# Patient Record
Sex: FEMALE | Race: WHITE | NOT HISPANIC OR LATINO
[De-identification: names, ages, dates, MRNs, and addresses within clinical notes are randomized per-mention and may not be internally consistent; named-entity substitution may affect disease eponyms.]

---

## 2017-03-06 ENCOUNTER — FORM ENCOUNTER (OUTPATIENT)
Age: 72
End: 2017-03-06

## 2017-03-07 ENCOUNTER — APPOINTMENT (OUTPATIENT)
Dept: ORTHOPEDIC SURGERY | Facility: CLINIC | Age: 72
End: 2017-03-07

## 2017-03-07 ENCOUNTER — OUTPATIENT (OUTPATIENT)
Dept: OUTPATIENT SERVICES | Facility: HOSPITAL | Age: 72
LOS: 1 days | End: 2017-03-07
Payer: MEDICARE

## 2017-03-07 VITALS
SYSTOLIC BLOOD PRESSURE: 138 MMHG | BODY MASS INDEX: 22.99 KG/M2 | HEIGHT: 65 IN | DIASTOLIC BLOOD PRESSURE: 76 MMHG | WEIGHT: 138 LBS

## 2017-03-07 DIAGNOSIS — M17.9 OSTEOARTHRITIS OF KNEE, UNSPECIFIED: ICD-10-CM

## 2017-03-07 PROCEDURE — 73564 X-RAY EXAM KNEE 4 OR MORE: CPT

## 2017-03-07 PROCEDURE — 73564 X-RAY EXAM KNEE 4 OR MORE: CPT | Mod: 26,50

## 2018-09-18 ENCOUNTER — APPOINTMENT (OUTPATIENT)
Dept: ORTHOPEDIC SURGERY | Facility: CLINIC | Age: 73
End: 2018-09-18
Payer: MEDICARE

## 2018-09-18 VITALS
HEART RATE: 78 BPM | WEIGHT: 134 LBS | DIASTOLIC BLOOD PRESSURE: 80 MMHG | BODY MASS INDEX: 22.33 KG/M2 | OXYGEN SATURATION: 98 % | SYSTOLIC BLOOD PRESSURE: 120 MMHG | HEIGHT: 65 IN

## 2018-09-18 DIAGNOSIS — Z96.649 PRESENCE OF UNSPECIFIED ARTIFICIAL HIP JOINT: ICD-10-CM

## 2018-09-18 DIAGNOSIS — Z87.891 PERSONAL HISTORY OF NICOTINE DEPENDENCE: ICD-10-CM

## 2018-09-18 DIAGNOSIS — Z82.49 FAMILY HISTORY OF ISCHEMIC HEART DISEASE AND OTHER DISEASES OF THE CIRCULATORY SYSTEM: ICD-10-CM

## 2018-09-18 DIAGNOSIS — Z78.9 OTHER SPECIFIED HEALTH STATUS: ICD-10-CM

## 2018-09-18 DIAGNOSIS — Z80.1 FAMILY HISTORY OF MALIGNANT NEOPLASM OF TRACHEA, BRONCHUS AND LUNG: ICD-10-CM

## 2018-09-18 PROCEDURE — 99214 OFFICE O/P EST MOD 30 MIN: CPT

## 2018-09-18 RX ORDER — BERBERINE CHLOR/SEAWEED/CHROM 500-250 MG
CAPSULE ORAL
Refills: 0 | Status: ACTIVE | COMMUNITY

## 2018-09-28 ENCOUNTER — APPOINTMENT (OUTPATIENT)
Dept: ORTHOPEDIC SURGERY | Facility: CLINIC | Age: 73
End: 2018-09-28

## 2018-10-08 ENCOUNTER — RX RENEWAL (OUTPATIENT)
Age: 73
End: 2018-10-08

## 2018-10-09 ENCOUNTER — APPOINTMENT (OUTPATIENT)
Age: 73
End: 2018-10-09
Payer: MEDICARE

## 2018-10-09 DIAGNOSIS — S83.281A OTHER TEAR OF LATERAL MENISCUS, CURRENT INJURY, RIGHT KNEE, INITIAL ENCOUNTER: ICD-10-CM

## 2018-10-09 PROCEDURE — 99214 OFFICE O/P EST MOD 30 MIN: CPT

## 2019-01-04 ENCOUNTER — OTHER (OUTPATIENT)
Age: 74
End: 2019-01-04

## 2022-07-19 RX ORDER — SODIUM CHLORIDE 9 MG/ML
1000 INJECTION, SOLUTION INTRAVENOUS
Refills: 0 | Status: DISCONTINUED | OUTPATIENT
Start: 2022-07-20 | End: 2022-07-20

## 2022-07-19 NOTE — ASU PATIENT PROFILE, ADULT - NS PREOP UNDERSTANDS INFO
As per MD nothing to eat or drink after midnight. Patient instructed to come with photo ID, vaccination and insurance card; dress comfortable; no jewelries; no smoking, alcohol drinking or illicit drug use tonight; escort must show proof of vaccination and photo ID; address, parking information and call back number provided to patient/yes

## 2022-07-20 ENCOUNTER — OUTPATIENT (OUTPATIENT)
Dept: OUTPATIENT SERVICES | Facility: HOSPITAL | Age: 77
LOS: 1 days | Discharge: HOME | End: 2022-07-20

## 2022-07-20 VITALS
HEART RATE: 65 BPM | TEMPERATURE: 97 F | SYSTOLIC BLOOD PRESSURE: 136 MMHG | RESPIRATION RATE: 14 BRPM | DIASTOLIC BLOOD PRESSURE: 63 MMHG | OXYGEN SATURATION: 98 %

## 2022-07-20 VITALS
HEIGHT: 64 IN | RESPIRATION RATE: 16 BRPM | SYSTOLIC BLOOD PRESSURE: 165 MMHG | OXYGEN SATURATION: 98 % | DIASTOLIC BLOOD PRESSURE: 68 MMHG | TEMPERATURE: 98 F | WEIGHT: 126.77 LBS | HEART RATE: 67 BPM

## 2022-07-20 DIAGNOSIS — H26.9 UNSPECIFIED CATARACT: Chronic | ICD-10-CM

## 2022-07-20 DIAGNOSIS — Z96.642 PRESENCE OF LEFT ARTIFICIAL HIP JOINT: Chronic | ICD-10-CM

## 2022-07-20 DIAGNOSIS — Z98.890 OTHER SPECIFIED POSTPROCEDURAL STATES: Chronic | ICD-10-CM

## 2022-07-20 DEVICE — LENS IOL TECNIS SMPLCTY 1PC CLR MONO DCB0000 23.5D
Type: IMPLANTABLE DEVICE | Site: LEFT | Status: NON-FUNCTIONAL
Removed: 2022-07-20

## 2022-07-20 RX ORDER — ACETAMINOPHEN 500 MG
650 TABLET ORAL ONCE
Refills: 0 | Status: DISCONTINUED | OUTPATIENT
Start: 2022-07-20 | End: 2022-07-20

## 2022-07-20 RX ORDER — CYCLOPENTOLATE HYDROCHLORIDE 10 MG/ML
1 SOLUTION/ DROPS OPHTHALMIC
Refills: 0 | Status: COMPLETED | OUTPATIENT
Start: 2022-07-20 | End: 2022-07-20

## 2022-07-20 RX ORDER — SODIUM CHLORIDE 9 MG/ML
1000 INJECTION, SOLUTION INTRAVENOUS
Refills: 0 | Status: DISCONTINUED | OUTPATIENT
Start: 2022-07-20 | End: 2022-07-21

## 2022-07-20 RX ORDER — OFLOXACIN 0.3 %
1 DROPS OPHTHALMIC (EYE)
Refills: 0 | Status: COMPLETED | OUTPATIENT
Start: 2022-07-20 | End: 2022-07-20

## 2022-07-20 RX ORDER — L.ACIDOPH/B.ANIMALIS/B.LONGUM 15B CELL
1 CAPSULE ORAL
Qty: 0 | Refills: 0 | DISCHARGE

## 2022-07-20 RX ORDER — UBIDECARENONE 100 MG
10 CAPSULE ORAL
Qty: 0 | Refills: 0 | DISCHARGE

## 2022-07-20 RX ORDER — TROPICAMIDE 1 %
1 DROPS OPHTHALMIC (EYE)
Refills: 0 | Status: COMPLETED | OUTPATIENT
Start: 2022-07-20 | End: 2022-07-20

## 2022-07-20 RX ORDER — CHOLECALCIFEROL (VITAMIN D3) 125 MCG
1 CAPSULE ORAL
Qty: 0 | Refills: 0 | DISCHARGE

## 2022-07-20 RX ORDER — PHENYLEPHRINE HCL 2.5 %
1 DROPS OPHTHALMIC (EYE)
Refills: 0 | Status: COMPLETED | OUTPATIENT
Start: 2022-07-20 | End: 2022-07-20

## 2022-07-20 RX ORDER — KETOROLAC TROMETHAMINE 0.5 %
1 DROPS OPHTHALMIC (EYE)
Refills: 0 | Status: COMPLETED | OUTPATIENT
Start: 2022-07-20 | End: 2022-07-20

## 2022-07-20 RX ADMIN — Medication 1 DROP(S): at 14:21

## 2022-07-20 RX ADMIN — Medication 1 DROP(S): at 14:03

## 2022-07-20 RX ADMIN — CYCLOPENTOLATE HYDROCHLORIDE 1 DROP(S): 10 SOLUTION/ DROPS OPHTHALMIC at 14:14

## 2022-07-20 RX ADMIN — Medication 1 DROP(S): at 14:14

## 2022-07-20 RX ADMIN — Medication 1 DROP(S): at 14:20

## 2022-07-20 RX ADMIN — CYCLOPENTOLATE HYDROCHLORIDE 1 DROP(S): 10 SOLUTION/ DROPS OPHTHALMIC at 14:20

## 2022-07-20 RX ADMIN — Medication 1 DROP(S): at 14:04

## 2022-07-20 RX ADMIN — Medication 1 DROP(S): at 14:13

## 2022-07-20 RX ADMIN — CYCLOPENTOLATE HYDROCHLORIDE 1 DROP(S): 10 SOLUTION/ DROPS OPHTHALMIC at 14:03

## 2022-07-20 NOTE — OPERATIVE REPORT - OPERATIVE RPOSRT DETAILS
Date of Procedure:7/21/22    Surgeon:  Darwin Larkin    Diagnosis:  CATARACT SYNECHIAE WITH SMALL PUPIL    Procedure:  CATARACT REMOVAL with IOL with SYNECHIALYSIS/PUPILLOPLASTY    Anesthesia: MAC    Complications: None      The patient was prepped and draped in the usual sterile fashion. A lid speculum is placed in the eye. Rhoda scissors are used to dissect conjunctiva and tenons from the superior limbus for one clock hour. Wet field cautery is used to obtain hemostasis. A crescent blade is used to create a circum-limbal groove 2mm posterior to the surgical limbus at 12 o’clock and is dissected at fifty percent scleral thickness just to clear cornea. A fifteen degree super-blade is used to create a paracentesis at the ten and two o’clock positions. The chamber is then entered with a 2.4mm blade through the scleral flap. Healon is used to reconstitute the anterior chamber. Posterioir Synechiae are broken with a combination of Healon and Sinsky hook. Vaness scissors are used to create sphinterotomies at the 3 and 9 o’clco positions. A capsulorhexus is then created with a cystitome for 360 degrees. The dissected capsule is removed with a curved forceps and hydrodissection is performed using BSS on an air needle. Next, the lens is removed with torsional ultrasound with residual cortex removed with the I/A unit.    Next, with the anterior chamber reconstituted with healon, a  is used to insert a foldable IOL into the capsular bag. The lens is rotated into central position and the haptics are seen to extend fully. Residual Healon is removed with the I/A unit  and the pupil is brought down with Miochol. One 10-0 nylon suture is used to close the scleral wound and one suture is used to close conjunctiva and tenons. The lid speculum is removed and pilocarpine 2% with Tobradex ointment is placed on the cornea and the eye is closed, patched and shielded.    The patient is transferred to the recovery in baseline condition.

## 2022-09-08 PROBLEM — Z78.9 OTHER SPECIFIED HEALTH STATUS: Chronic | Status: ACTIVE | Noted: 2022-07-20

## 2022-09-09 ENCOUNTER — APPOINTMENT (OUTPATIENT)
Dept: ULTRASOUND IMAGING | Facility: CLINIC | Age: 77
End: 2022-09-09

## 2022-10-04 ENCOUNTER — APPOINTMENT (OUTPATIENT)
Dept: ULTRASOUND IMAGING | Facility: CLINIC | Age: 77
End: 2022-10-04

## 2022-10-04 PROCEDURE — 76830 TRANSVAGINAL US NON-OB: CPT

## 2022-10-04 PROCEDURE — 76641 ULTRASOUND BREAST COMPLETE: CPT | Mod: 50

## 2023-03-28 ENCOUNTER — APPOINTMENT (OUTPATIENT)
Dept: ULTRASOUND IMAGING | Facility: CLINIC | Age: 78
End: 2023-03-28
Payer: MEDICARE

## 2023-03-28 ENCOUNTER — APPOINTMENT (OUTPATIENT)
Dept: RADIOLOGY | Facility: CLINIC | Age: 78
End: 2023-03-28
Payer: MEDICARE

## 2023-03-28 PROCEDURE — 76830 TRANSVAGINAL US NON-OB: CPT

## 2023-03-28 PROCEDURE — 77080 DXA BONE DENSITY AXIAL: CPT

## 2023-04-28 ENCOUNTER — APPOINTMENT (OUTPATIENT)
Dept: ORTHOPEDIC SURGERY | Facility: CLINIC | Age: 78
End: 2023-04-28

## 2023-05-19 ENCOUNTER — APPOINTMENT (OUTPATIENT)
Dept: MAMMOGRAPHY | Facility: CLINIC | Age: 78
End: 2023-05-19
Payer: MEDICARE

## 2023-05-19 ENCOUNTER — APPOINTMENT (OUTPATIENT)
Dept: ULTRASOUND IMAGING | Facility: CLINIC | Age: 78
End: 2023-05-19
Payer: MEDICARE

## 2023-05-19 PROCEDURE — 76641 ULTRASOUND BREAST COMPLETE: CPT | Mod: 50,GA

## 2023-05-19 PROCEDURE — 77063 BREAST TOMOSYNTHESIS BI: CPT

## 2023-05-19 PROCEDURE — 77067 SCR MAMMO BI INCL CAD: CPT

## 2023-12-04 ENCOUNTER — RESULT REVIEW (OUTPATIENT)
Age: 78
End: 2023-12-04

## 2023-12-04 ENCOUNTER — APPOINTMENT (OUTPATIENT)
Dept: ORTHOPEDIC SURGERY | Facility: CLINIC | Age: 78
End: 2023-12-04
Payer: MEDICARE

## 2023-12-04 ENCOUNTER — OUTPATIENT (OUTPATIENT)
Dept: OUTPATIENT SERVICES | Facility: HOSPITAL | Age: 78
LOS: 1 days | End: 2023-12-04
Payer: MEDICARE

## 2023-12-04 VITALS
BODY MASS INDEX: 22.49 KG/M2 | DIASTOLIC BLOOD PRESSURE: 60 MMHG | TEMPERATURE: 98.4 F | WEIGHT: 135 LBS | SYSTOLIC BLOOD PRESSURE: 138 MMHG | OXYGEN SATURATION: 97 % | HEIGHT: 65 IN | HEART RATE: 75 BPM

## 2023-12-04 DIAGNOSIS — Z96.642 PRESENCE OF LEFT ARTIFICIAL HIP JOINT: Chronic | ICD-10-CM

## 2023-12-04 DIAGNOSIS — Z98.890 OTHER SPECIFIED POSTPROCEDURAL STATES: Chronic | ICD-10-CM

## 2023-12-04 DIAGNOSIS — H26.9 UNSPECIFIED CATARACT: Chronic | ICD-10-CM

## 2023-12-04 DIAGNOSIS — Z96.642 PRESENCE OF LEFT ARTIFICIAL HIP JOINT: ICD-10-CM

## 2023-12-04 DIAGNOSIS — M16.11 UNILATERAL PRIMARY OSTEOARTHRITIS, RIGHT HIP: ICD-10-CM

## 2023-12-04 PROCEDURE — 99203 OFFICE O/P NEW LOW 30 MIN: CPT

## 2023-12-04 PROCEDURE — 73521 X-RAY EXAM HIPS BI 2 VIEWS: CPT | Mod: 26

## 2023-12-04 PROCEDURE — 73521 X-RAY EXAM HIPS BI 2 VIEWS: CPT

## 2024-05-21 ENCOUNTER — APPOINTMENT (OUTPATIENT)
Dept: ULTRASOUND IMAGING | Facility: CLINIC | Age: 79
End: 2024-05-21

## 2024-05-21 ENCOUNTER — APPOINTMENT (OUTPATIENT)
Dept: MAMMOGRAPHY | Facility: CLINIC | Age: 79
End: 2024-05-21
Payer: MEDICARE

## 2024-05-21 PROCEDURE — 76641 ULTRASOUND BREAST COMPLETE: CPT | Mod: 50,3G

## 2024-05-21 PROCEDURE — 77067 SCR MAMMO BI INCL CAD: CPT

## 2024-05-21 PROCEDURE — 77063 BREAST TOMOSYNTHESIS BI: CPT

## 2024-06-07 DIAGNOSIS — M25.562 PAIN IN RIGHT KNEE: ICD-10-CM

## 2024-06-07 DIAGNOSIS — M25.561 PAIN IN RIGHT KNEE: ICD-10-CM

## 2024-12-17 ENCOUNTER — APPOINTMENT (OUTPATIENT)
Dept: ULTRASOUND IMAGING | Facility: CLINIC | Age: 79
End: 2024-12-17
Payer: MEDICARE

## 2024-12-17 ENCOUNTER — APPOINTMENT (OUTPATIENT)
Dept: RADIOLOGY | Facility: CLINIC | Age: 79
End: 2024-12-17

## 2024-12-17 PROCEDURE — 76641 ULTRASOUND BREAST COMPLETE: CPT | Mod: 50,GA

## 2024-12-17 PROCEDURE — 76830 TRANSVAGINAL US NON-OB: CPT

## 2025-03-07 ENCOUNTER — RESULT REVIEW (OUTPATIENT)
Age: 80
End: 2025-03-07

## 2025-03-07 ENCOUNTER — APPOINTMENT (OUTPATIENT)
Dept: ORTHOPEDIC SURGERY | Facility: CLINIC | Age: 80
End: 2025-03-07
Payer: MEDICARE

## 2025-03-07 ENCOUNTER — OUTPATIENT (OUTPATIENT)
Dept: OUTPATIENT SERVICES | Facility: HOSPITAL | Age: 80
LOS: 1 days | End: 2025-03-07
Payer: MEDICARE

## 2025-03-07 VITALS
BODY MASS INDEX: 22.49 KG/M2 | SYSTOLIC BLOOD PRESSURE: 148 MMHG | HEART RATE: 68 BPM | HEIGHT: 65 IN | DIASTOLIC BLOOD PRESSURE: 71 MMHG | OXYGEN SATURATION: 97 % | WEIGHT: 135 LBS

## 2025-03-07 DIAGNOSIS — H26.9 UNSPECIFIED CATARACT: Chronic | ICD-10-CM

## 2025-03-07 DIAGNOSIS — Z96.642 PRESENCE OF LEFT ARTIFICIAL HIP JOINT: Chronic | ICD-10-CM

## 2025-03-07 DIAGNOSIS — Z98.890 OTHER SPECIFIED POSTPROCEDURAL STATES: Chronic | ICD-10-CM

## 2025-03-07 DIAGNOSIS — Z96.642 PRESENCE OF LEFT ARTIFICIAL HIP JOINT: ICD-10-CM

## 2025-03-07 DIAGNOSIS — M17.11 UNILATERAL PRIMARY OSTEOARTHRITIS, RIGHT KNEE: ICD-10-CM

## 2025-03-07 DIAGNOSIS — M16.11 UNILATERAL PRIMARY OSTEOARTHRITIS, RIGHT HIP: ICD-10-CM

## 2025-03-07 PROCEDURE — 99213 OFFICE O/P EST LOW 20 MIN: CPT

## 2025-03-07 PROCEDURE — 73564 X-RAY EXAM KNEE 4 OR MORE: CPT

## 2025-03-07 PROCEDURE — 73521 X-RAY EXAM HIPS BI 2 VIEWS: CPT

## 2025-03-07 PROCEDURE — 73564 X-RAY EXAM KNEE 4 OR MORE: CPT | Mod: 26,50

## 2025-03-07 PROCEDURE — 73521 X-RAY EXAM HIPS BI 2 VIEWS: CPT | Mod: 26

## 2025-04-01 ENCOUNTER — APPOINTMENT (OUTPATIENT)
Dept: RADIOLOGY | Facility: CLINIC | Age: 80
End: 2025-04-01
Payer: MEDICARE

## 2025-04-01 PROCEDURE — 77080 DXA BONE DENSITY AXIAL: CPT

## 2025-07-01 ENCOUNTER — APPOINTMENT (OUTPATIENT)
Dept: ULTRASOUND IMAGING | Facility: CLINIC | Age: 80
End: 2025-07-01

## 2025-07-01 ENCOUNTER — APPOINTMENT (OUTPATIENT)
Dept: MAMMOGRAPHY | Facility: CLINIC | Age: 80
End: 2025-07-01
Payer: MEDICARE

## 2025-07-01 PROCEDURE — 77063 BREAST TOMOSYNTHESIS BI: CPT

## 2025-07-01 PROCEDURE — 77067 SCR MAMMO BI INCL CAD: CPT

## 2025-07-01 PROCEDURE — 76641 ULTRASOUND BREAST COMPLETE: CPT | Mod: 50,GZ

## (undated) DEVICE — KNIFE ALCON CRESCENT ANGLED BEVEL UP 2.3MM (PINK)

## (undated) DEVICE — ELCTR ERASER BI-P BVL 45DEG 18G

## (undated) DEVICE — PACK CENTURION 2.4MM

## (undated) DEVICE — Device

## (undated) DEVICE — SUT ETHILON 10-0 12" TG160-4

## (undated) DEVICE — GLV 7.5 PROTEXIS (BLUE)

## (undated) DEVICE — TIP OZIL 12 DEGREE MINI FLARE

## (undated) DEVICE — KNIFE ALCON STANDARD FULL HANDLE 15 DEG (PINK)

## (undated) DEVICE — ELCTR BIPOLAR CORD J&J 12FT DISP

## (undated) DEVICE — DRAPE MICROSCOPE KNOB COVER SMALL (2 PCS)